# Patient Record
Sex: FEMALE | Race: WHITE
[De-identification: names, ages, dates, MRNs, and addresses within clinical notes are randomized per-mention and may not be internally consistent; named-entity substitution may affect disease eponyms.]

---

## 2018-06-13 ENCOUNTER — HOSPITAL ENCOUNTER (OUTPATIENT)
Dept: HOSPITAL 92 - RAD-FRANK | Age: 68
Discharge: HOME | End: 2018-06-13
Attending: NURSE PRACTITIONER
Payer: MEDICARE

## 2018-06-13 DIAGNOSIS — M54.5: Primary | ICD-10-CM

## 2018-06-13 DIAGNOSIS — M47.896: ICD-10-CM

## 2018-06-13 DIAGNOSIS — M41.9: ICD-10-CM

## 2018-06-13 DIAGNOSIS — I70.90: ICD-10-CM

## 2018-06-13 DIAGNOSIS — N30.01: ICD-10-CM

## 2018-06-13 PROCEDURE — 72100 X-RAY EXAM L-S SPINE 2/3 VWS: CPT

## 2018-06-13 PROCEDURE — 74018 RADEX ABDOMEN 1 VIEW: CPT

## 2018-06-13 NOTE — RAD
ABDOMEN 1 VIEW:

 

HISTORY: 

A 68-year-old female with a history of hematuria. 

 

FINDINGS: 

There is levoscoliosis with vertical height loss of L2 and L3 vertebral bodies and severe desiccation
 changes at L4-L5 and L5-S1.  Right upper quadrant surgical clips, evidence for prior cholecystectomy
.  No overt renal calculi.  No bowel obstruction.

 

IMPRESSION: 

No bowel obstruction or overt calculi.  Lumbar spine levoscoliosis with vertical height loss of L2 an
d l3 vertebral bodies and lumbar spondylosis.

 

POS: C

## 2018-10-25 ENCOUNTER — HOSPITAL ENCOUNTER (OUTPATIENT)
Dept: HOSPITAL 92 - RAD-FRANK | Age: 68
Discharge: HOME | End: 2018-10-25
Attending: NURSE PRACTITIONER
Payer: MEDICARE

## 2018-10-25 DIAGNOSIS — J44.9: Primary | ICD-10-CM

## 2018-10-25 DIAGNOSIS — R05: ICD-10-CM

## 2018-10-25 DIAGNOSIS — F17.210: ICD-10-CM

## 2018-10-25 DIAGNOSIS — H91.90: ICD-10-CM

## 2018-10-25 PROCEDURE — 71046 X-RAY EXAM CHEST 2 VIEWS: CPT

## 2018-10-25 NOTE — RAD
TWO VIEWS CHEST:

 

Date: 10-25-18 

 

Comparison: 5-16-17

 

Provided Clinical History: 

Cough

 

FINDINGS: 

There is prominent dextroscoliosis of the midthoracic spine, stable. Cholecystectomy clips are presen
t. No pneumothorax or pleural fluid. No focal consolidation or alveolar edema. 

 

IMPRESSION: 

Stable two view examination of the chest. 

 

POS: [facility]

## 2019-04-02 ENCOUNTER — HOSPITAL ENCOUNTER (EMERGENCY)
Dept: HOSPITAL 92 - ERS | Age: 69
Discharge: HOME | End: 2019-04-02
Payer: MEDICARE

## 2019-04-02 DIAGNOSIS — E78.5: ICD-10-CM

## 2019-04-02 DIAGNOSIS — J44.0: Primary | ICD-10-CM

## 2019-04-02 DIAGNOSIS — J44.1: ICD-10-CM

## 2019-04-02 DIAGNOSIS — F32.9: ICD-10-CM

## 2019-04-02 DIAGNOSIS — Z79.82: ICD-10-CM

## 2019-04-02 DIAGNOSIS — I25.10: ICD-10-CM

## 2019-04-02 DIAGNOSIS — F41.9: ICD-10-CM

## 2019-04-02 DIAGNOSIS — F17.210: ICD-10-CM

## 2019-04-02 DIAGNOSIS — J20.9: ICD-10-CM

## 2019-04-02 DIAGNOSIS — I10: ICD-10-CM

## 2019-04-02 DIAGNOSIS — Z79.899: ICD-10-CM

## 2019-04-02 PROCEDURE — 94640 AIRWAY INHALATION TREATMENT: CPT

## 2019-04-02 PROCEDURE — 71046 X-RAY EXAM CHEST 2 VIEWS: CPT

## 2019-04-02 NOTE — RAD
FEXAM:

Two views chest



PROVIDED CLINICAL HISTORY:

Cough



COMPARISON:

10/25/2018



FINDINGS:

Cardiac and mediastinal silhouette appears within normal limits. Lungs appear free of significant opa
city. No pleural fluid or pneumothorax apparent. Scoliosis of the thoracolumbar spine is redemonstrat
ed.



IMPRESSION:

No evidence for an acute cardiopulmonary process.



Reported By: Sean Israel 

Electronically Signed:  4/2/2019 7:33 PM

## 2020-06-19 ENCOUNTER — HOSPITAL ENCOUNTER (OUTPATIENT)
Dept: HOSPITAL 92 - BICCT | Age: 70
Discharge: HOME | End: 2020-06-19
Attending: INTERNAL MEDICINE
Payer: MEDICARE

## 2020-06-19 DIAGNOSIS — L98.9: ICD-10-CM

## 2020-06-19 DIAGNOSIS — R10.9: Primary | ICD-10-CM

## 2020-06-19 DIAGNOSIS — R59.0: ICD-10-CM

## 2020-06-19 DIAGNOSIS — R14.0: ICD-10-CM

## 2020-06-19 LAB — ESTIMATED GFR-MDRD - POC: (no result)

## 2020-06-19 PROCEDURE — 74177 CT ABD & PELVIS W/CONTRAST: CPT

## 2020-06-19 PROCEDURE — 76536 US EXAM OF HEAD AND NECK: CPT

## 2020-06-19 PROCEDURE — 82565 ASSAY OF CREATININE: CPT

## 2020-06-19 NOTE — ULT
Ultrasound neck:



DATE:

6/19/2020



HISTORY:

70-year-old female with ICD-10: "R 59.0 left cervical lymphadenopathy"



FINDINGS:

There is a 1.6 x 0.5 x 1.4 cm lymph node located in the superficial subcutaneous tissues in a locatio
n labeled as "left upper back of neck."



There is a 1.7 x 0.7 x 1.2 cm smoothly well-circumscribed, wider than tall, very hypoechoic (almost a
nechoic) structure located in the superficial subcutaneous tissue in a location labeled as "left

upper back of neck." No shadowing. Acoustic enhancement posteriorly. Blood flow demonstrated at perip
kya in one corner of this lesion.



IMPRESSION:

1.) 2 focal lesions in the subcutaneous superficial soft tissues.

2) the larger one is either a cyst or a mildly enlarged lymph node.

3) the other one is a lymph node



Reported By: Stephane Price 

Electronically Signed:  6/19/2020 8:20 AM

## 2020-06-19 NOTE — CT
CT ABDOMEN AND PELVIS WITH ORAL AND IV CONTRAST:

 

Date:  06/19/2020

 

HISTORY:  

Abdominal pain, swelling and bloating. 

 

FINDINGS:

There are mild dependent changes in the lung bases. The liver, spleen, pancreas, adrenal glands, and 
kidneys are normal. The patient is post cholecystectomy with mild reservoir effect. No free air, free
 fluid, or lymphadenopathy seen in the abdomen or pelvis. The small bowel loops are not abnormally di
lated. The patient is post hysterectomy. There are vascular calcifications without evidence of aneury
smal dilatation of the abdominal aorta. There are degenerative changes and scoliosis of the spine. 

 

IMPRESSION: 

No acute process. 

 

 

POS: [facility]

## 2020-09-14 ENCOUNTER — HOSPITAL ENCOUNTER (OUTPATIENT)
Dept: HOSPITAL 92 - LABBT | Age: 70
Discharge: HOME | End: 2020-09-14
Attending: ORTHOPAEDIC SURGERY
Payer: MEDICARE

## 2020-09-14 DIAGNOSIS — Z20.828: ICD-10-CM

## 2020-09-14 DIAGNOSIS — Z01.818: Primary | ICD-10-CM

## 2020-09-14 DIAGNOSIS — G56.01: ICD-10-CM

## 2020-09-14 LAB
ANION GAP SERPL CALC-SCNC: 19 MMOL/L (ref 10–20)
BASOPHILS # BLD AUTO: 0.1 THOU/UL (ref 0–0.2)
BASOPHILS NFR BLD AUTO: 1.2 % (ref 0–1)
BUN SERPL-MCNC: 13 MG/DL (ref 9.8–20.1)
CALCIUM SERPL-MCNC: 8.9 MG/DL (ref 7.8–10.44)
CHLORIDE SERPL-SCNC: 105 MMOL/L (ref 98–107)
CO2 SERPL-SCNC: 19 MMOL/L (ref 23–31)
CREAT CL PREDICTED SERPL C-G-VRATE: 0 ML/MIN (ref 70–130)
EOSINOPHIL # BLD AUTO: 0.2 THOU/UL (ref 0–0.7)
EOSINOPHIL NFR BLD AUTO: 4.6 % (ref 0–10)
GLUCOSE SERPL-MCNC: 92 MG/DL (ref 80–115)
HGB BLD-MCNC: 12.3 G/DL (ref 12–16)
INR PPP: 1
LYMPHOCYTES # BLD: 1.8 THOU/UL (ref 1.2–3.4)
LYMPHOCYTES NFR BLD AUTO: 33.8 % (ref 21–51)
MCH RBC QN AUTO: 32.4 PG (ref 27–31)
MCV RBC AUTO: 97.4 FL (ref 78–98)
MONOCYTES # BLD AUTO: 0.4 THOU/UL (ref 0.11–0.59)
MONOCYTES NFR BLD AUTO: 7.6 % (ref 0–10)
NEUTROPHILS # BLD AUTO: 2.8 THOU/UL (ref 1.4–6.5)
NEUTROPHILS NFR BLD AUTO: 52.9 % (ref 42–75)
PLATELET # BLD AUTO: 138 THOU/UL (ref 130–400)
POTASSIUM SERPL-SCNC: 4.3 MMOL/L (ref 3.5–5.1)
PROTHROMBIN TIME: 13.3 SEC (ref 12–14.7)
RBC # BLD AUTO: 3.81 MILL/UL (ref 4.2–5.4)
SODIUM SERPL-SCNC: 139 MMOL/L (ref 136–145)
WBC # BLD AUTO: 5.3 THOU/UL (ref 4.8–10.8)

## 2020-09-14 PROCEDURE — 71046 X-RAY EXAM CHEST 2 VIEWS: CPT

## 2020-09-14 PROCEDURE — 80048 BASIC METABOLIC PNL TOTAL CA: CPT

## 2020-09-14 PROCEDURE — 85025 COMPLETE CBC W/AUTO DIFF WBC: CPT

## 2020-09-14 PROCEDURE — U0003 INFECTIOUS AGENT DETECTION BY NUCLEIC ACID (DNA OR RNA); SEVERE ACUTE RESPIRATORY SYNDROME CORONAVIRUS 2 (SARS-COV-2) (CORONAVIRUS DISEASE [COVID-19]), AMPLIFIED PROBE TECHNIQUE, MAKING USE OF HIGH THROUGHPUT TECHNOLOGIES AS DESCRIBED BY CMS-2020-01-R: HCPCS

## 2020-09-14 PROCEDURE — 93005 ELECTROCARDIOGRAM TRACING: CPT

## 2020-09-14 PROCEDURE — 87635 SARS-COV-2 COVID-19 AMP PRB: CPT

## 2020-09-14 PROCEDURE — 93010 ELECTROCARDIOGRAM REPORT: CPT

## 2020-09-14 PROCEDURE — 85610 PROTHROMBIN TIME: CPT

## 2020-09-14 NOTE — RAD
EXAM:

Chest 2 views:



HISTORY:

Preoperative radiograph



COMPARISON:

4/2/2019



FINDINGS:

There is a normal-sized cardiomediastinal silhouette. There is no evidence of consolidation, mass, or
 pleural effusion.   There is moderate scoliotic curvature the spine.



IMPRESSION:

No evidence of acute cardiopulmonary disease



Reported By: Zeke Eubanks 

Electronically Signed:  9/14/2020 11:26 AM

## 2020-09-14 NOTE — EKG
Test Reason : 

Blood Pressure : ***/*** mmHG

Vent. Rate : 066 BPM     Atrial Rate : 066 BPM

   P-R Int : 160 ms          QRS Dur : 078 ms

    QT Int : 406 ms       P-R-T Axes : 077 007 056 degrees

   QTc Int : 425 ms

 

Normal sinus rhythm

Possible Left atrial enlargement

Borderline ECG

No previous ECGs available

Confirmed by FRANCES PEREZ M.D. ([id number]) on 9/14/2020 2:54:20 PM

 

Referred By:  BALDEMAR           Confirmed By:FRANCES PEREZ M.D.

## 2020-09-17 ENCOUNTER — HOSPITAL ENCOUNTER (OUTPATIENT)
Dept: HOSPITAL 92 - SDC | Age: 70
Discharge: HOME | End: 2020-09-17
Attending: ORTHOPAEDIC SURGERY
Payer: MEDICARE

## 2020-09-17 DIAGNOSIS — G56.01: Primary | ICD-10-CM

## 2020-09-17 DIAGNOSIS — Z88.5: ICD-10-CM

## 2020-09-17 DIAGNOSIS — Z79.899: ICD-10-CM

## 2020-09-17 PROCEDURE — 01N50ZZ RELEASE MEDIAN NERVE, OPEN APPROACH: ICD-10-PCS | Performed by: ORTHOPAEDIC SURGERY

## 2020-09-18 NOTE — OP
DATE OF PROCEDURE:  09/17/2020



PREOPERATIVE DIAGNOSIS:  Right carpal tunnel syndrome, recurrent.



POSTOPERATIVE DIAGNOSIS:  Right carpal tunnel syndrome, recurrent.



PROCEDURE PERFORMED:  Right carpal tunnel release.



ASSISTANT:  None.



ANESTHESIOLOGIST:  Jorge Luis Gomez MD



ANESTHESIA:  The patient received an LMA with 7 mL of 1% lidocaine with epinephrine.



TOURNIQUET TIME:  4 minutes.



ANTIBIOTICS:  Ancef 2 g.



ESTIMATED BLOOD LOSS:  20 mL.



COMPLICATIONS:  None.



HISTORY OF PRESENT ILLNESS:  Ms. Starr is a 70-year-old female, who presents with

recurrence of right carpal tunnel.  The patient had release 23 years ago, continued

to have pain, had nerve conduction studies consistent with carpal tunnel.  I

discussed with her the risks and benefits of right carpal tunnel release to include,

pain, scar, bleeding, infection, damage to vital structures, decreased range of

motion, continued pain despite surgical intervention, need for further surgeries,

loss of life or limb.  The patient understood the risks and benefits and elected to

proceed. 



DESCRIPTION OF PROCEDURE:  Time-out was performed designating the patient's right

upper extremity as the operative site based on site, consents, and markings.  After

time-out was performed, the patient's right upper extremity was prepped and draped

in sterile fashion.  Placed an injection in a wheal in line with the previous scar

as well as in line with the distal wrist, flexor crease.  We brought the tourniquet

up a total of 4 minutes.  I made an incision over the previous scar, just down

through skin using blunt dissection, came down to the palmar fascia.  The scar was a

little bit distal.  I used a hemostat to pop through the scar distally and used a

knife to transect the remnant palmar fascia coming down through almost like a

remnant scar plane, that was the recurrence of the transverse carpal ligament.  I

ensured it was released proximally and saw the nerve was irritated, tendons were

intact.  No other injury noted.  We washed, let the tourniquet down at 4 minutes,

controlled bleeding, closed with 4-0 nylon stitches, injected a total of 7 mL of

lidocaine with the pre and post injections.  I placed the patient in soft tissue

dressing. 



The patient will be followed up for suture removal in 10 to 14 days, begin hand

range of motion. 







Job ID:  [id number]

## 2021-11-09 NOTE — RAD
LUMBAR SPINE 3 VIEWS:

 

HISTORY: 

Low back pain.

 

FINDINGS: 

Five lumbar-type vertebrae.  Pedicles are intact.  Prominent leftward convex rotatory scoliotic curva
ture.  AP alignment maintained. 

 

Compression of the L3 superior end plate by approximately 30% is stable compared to MRI from 2010.  M
ild L2 superior end plate compression is now present but demonstrates well-corticated margins.  Promi
nent osteophytosis throughout the vertebral bodies and facets.  Reversal of the normal lordotic curva
ture.  Gas-disk phenomenon at the lowest 2 levels with disk space narrowing.  Calcification over the 
arterial structures.

 

IMPRESSION: 

1.  Chronic-appearing L2 and L3 superior end plate compressions.

 

2.  Prominent lumbar spondylosis.

 

3.  Atherosclerosis.

 

POS: [facility] Metronidazole Counseling:  I discussed with the patient the risks of metronidazole including but not limited to seizures, nausea/vomiting, a metallic taste in the mouth, nausea/vomiting and severe allergy.